# Patient Record
Sex: MALE | Race: WHITE | Employment: FULL TIME | URBAN - METROPOLITAN AREA
[De-identification: names, ages, dates, MRNs, and addresses within clinical notes are randomized per-mention and may not be internally consistent; named-entity substitution may affect disease eponyms.]

---

## 2017-10-16 ENCOUNTER — OFFICE VISIT (OUTPATIENT)
Dept: FAMILY MEDICINE CLINIC | Age: 52
End: 2017-10-16

## 2017-10-16 VITALS
OXYGEN SATURATION: 96 % | WEIGHT: 229.8 LBS | BODY MASS INDEX: 36.93 KG/M2 | DIASTOLIC BLOOD PRESSURE: 70 MMHG | SYSTOLIC BLOOD PRESSURE: 124 MMHG | TEMPERATURE: 95.7 F | HEIGHT: 66 IN | HEART RATE: 65 BPM

## 2017-10-16 DIAGNOSIS — F42.9 OBSESSIVE-COMPULSIVE DISORDER, UNSPECIFIED TYPE: ICD-10-CM

## 2017-10-16 DIAGNOSIS — K21.9 GASTROESOPHAGEAL REFLUX DISEASE, ESOPHAGITIS PRESENCE NOT SPECIFIED: Primary | ICD-10-CM

## 2017-10-16 DIAGNOSIS — Z71.3 DIETARY COUNSELING: ICD-10-CM

## 2017-10-16 DIAGNOSIS — G47.33 OSA (OBSTRUCTIVE SLEEP APNEA): ICD-10-CM

## 2017-10-16 DIAGNOSIS — M25.511 RIGHT SHOULDER PAIN, UNSPECIFIED CHRONICITY: ICD-10-CM

## 2017-10-16 DIAGNOSIS — E78.2 MIXED HYPERLIPIDEMIA: ICD-10-CM

## 2017-10-16 DIAGNOSIS — Z76.89 ESTABLISHING CARE WITH NEW DOCTOR, ENCOUNTER FOR: ICD-10-CM

## 2017-10-16 PROBLEM — E78.5 HYPERLIPIDEMIA: Status: ACTIVE | Noted: 2017-10-16

## 2017-10-16 PROBLEM — F32.A DEPRESSION: Status: ACTIVE | Noted: 2017-10-16

## 2017-10-16 PROCEDURE — 99203 OFFICE O/P NEW LOW 30 MIN: CPT | Performed by: FAMILY MEDICINE

## 2017-10-16 RX ORDER — LORATADINE 10 MG/1
1 TABLET ORAL
COMMUNITY
End: 2018-03-06 | Stop reason: SDUPTHER

## 2017-10-16 RX ORDER — VENLAFAXINE HYDROCHLORIDE 37.5 MG/1
CAPSULE, EXTENDED RELEASE ORAL
COMMUNITY
Start: 2017-09-18 | End: 2018-03-06 | Stop reason: SDUPTHER

## 2017-10-16 RX ORDER — OMEPRAZOLE 20 MG/1
20 TABLET, DELAYED RELEASE ORAL
COMMUNITY
Start: 2010-03-08 | End: 2018-03-06 | Stop reason: SDUPTHER

## 2017-10-16 RX ORDER — PRAVASTATIN SODIUM 40 MG
40 TABLET ORAL
COMMUNITY
Start: 2017-08-25 | End: 2018-03-06 | Stop reason: SDUPTHER

## 2017-10-16 RX ORDER — INFLUENZA A VIRUS A/MICHIGAN/45/2015 X-275 (H1N1) ANTIGEN (FORMALDEHYDE INACTIVATED), INFLUENZA A VIRUS A/SINGAPORE/INFIMH-16-0019/2016 IVR-186 (H3N2) ANTIGEN (FORMALDEHYDE INACTIVATED), INFLUENZA B VIRUS B/PHUKET/3073/2013 ANTIGEN (FORMALDEHYDE INACTIVATED), AND INFLUENZA B VIRUS B/MARYLAND/15/2016 BX-69A ANTIGEN (FORMALDEHYDE INACTIVATED) 15; 15; 15; 15 UG/.5ML; UG/.5ML; UG/.5ML; UG/.5ML
1 INJECTION, SUSPENSION INTRAMUSCULAR ONCE
COMMUNITY
Start: 2017-10-03 | End: 2019-03-11

## 2017-10-16 ASSESSMENT — PATIENT HEALTH QUESTIONNAIRE - PHQ9
SUM OF ALL RESPONSES TO PHQ QUESTIONS 1-9: 0
2. FEELING DOWN, DEPRESSED OR HOPELESS: 0
1. LITTLE INTEREST OR PLEASURE IN DOING THINGS: 0
SUM OF ALL RESPONSES TO PHQ9 QUESTIONS 1 & 2: 0

## 2017-10-16 ASSESSMENT — ENCOUNTER SYMPTOMS
NAUSEA: 0
SHORTNESS OF BREATH: 0
VOMITING: 0
DIARRHEA: 0
ABDOMINAL PAIN: 0
COUGH: 0

## 2017-10-16 NOTE — PROGRESS NOTES
Subjective:      Patient ID: Reno Chang is a 47 y.o. male. Austyn Smith is here to establish care, discuss his OCD/HAs, right shoulder, vertigo. OCD  At his last visit, his effexor was reduced to 37.5mg as he felt his HAs were from the effexor, and the dose was moved to bedtime. He has noticed some improvement, but not resolution. His OCD symptoms have slightly worsened with the lower dose - the thoughts have returned a little. Right shoulder  He is doing PT at [facility]. The shoulder is much better, but is not resolved yet. Currently they are working on the supraspinatus. Had dry needling this past week. Vertigo  Resolved with vestibular rehab at [facility]. STEPH  Chronic. On CPAP. Sleeping better but still not restful every night. Hyperlipidemia  Chronic. On lovastatin. Labs were good in August.     Review of Systems   Constitutional: Negative for fever and unexpected weight change. HENT: Negative for hearing loss. Eyes: Negative for visual disturbance. Respiratory: Negative for cough and shortness of breath. Cardiovascular: Negative for chest pain, palpitations and leg swelling. Gastrointestinal: Negative for abdominal pain, diarrhea, nausea and vomiting. Endocrine: Negative for cold intolerance and heat intolerance. Genitourinary: Negative for dysuria and hematuria. Musculoskeletal: Positive for arthralgias (right shoulder). Skin: Negative for rash and wound. Neurological: Positive for headaches. Negative for dizziness, weakness, light-headedness and numbness. Hematological: Negative for adenopathy. Does not bruise/bleed easily. Psychiatric/Behavioral: Negative for sleep disturbance. The patient is not nervous/anxious. Objective:   Physical Exam   Constitutional: He is oriented to person, place, and time. He appears well-developed and well-nourished. No distress. Cardiovascular: Normal rate and regular rhythm. No murmur heard.   Pulmonary/Chest: Effort normal

## 2017-11-06 ENCOUNTER — TELEPHONE (OUTPATIENT)
Dept: FAMILY MEDICINE CLINIC | Age: 52
End: 2017-11-06

## 2017-11-06 NOTE — TELEPHONE ENCOUNTER
----- Message from Alfredo Craft MD sent at 11/6/2017  8:11 AM EST -----  Please contact [address] for Giles's notes on his shoulder PT.      Thanks,   Dr. Jayashree Guan

## 2017-12-04 ENCOUNTER — TELEPHONE (OUTPATIENT)
Dept: FAMILY MEDICINE CLINIC | Age: 52
End: 2017-12-04

## 2017-12-04 DIAGNOSIS — M25.511 RIGHT SHOULDER PAIN, UNSPECIFIED CHRONICITY: Primary | ICD-10-CM

## 2017-12-04 NOTE — TELEPHONE ENCOUNTER
Giles's x ray of his shoulder was available for review today. Result Impression   Advanced degenerative changes. Electronically Signed by: Yoly Allison MD, 12/3/2017 3:27 PM   Result Narrative   REASON FOR EXAM M25.511: Pain in right shoulder  G89.29: Other chronic pain    Multiple routine views in various projections reveal the bones of the right shoulder, joints and adjacent soft tissues to demonstrate sclerosis of cortical cystic change humeral head. Exuberant spur shoulder joint. Mild spurring at [facility] joint. No fracture, dislocation, soft tissue swelling or soft tissue calcifications are evident.  There is not evidence of a foreign body. Given his lack of improvement with PT, the advanced arthritic changes and the spur, I recommend he see ortho for further evaluation and treatment. Who does he want to see?

## 2017-12-05 NOTE — TELEPHONE ENCOUNTER
Pt notified and said if it something that he can get fixed he would be willing to see someone.   Pt state he would like to see someone in [facility] if necessary.      (pt contact number is [phone])

## 2017-12-07 NOTE — TELEPHONE ENCOUNTER
Dr Lieutenant Rosen office dose not accept pt insurance and pt is not willing to travel to [facility]. Pt advised to call his insurance company to see who is in network and to call the office with information.

## 2018-03-06 ENCOUNTER — OFFICE VISIT (OUTPATIENT)
Dept: FAMILY MEDICINE CLINIC | Age: 53
End: 2018-03-06

## 2018-03-06 VITALS
SYSTOLIC BLOOD PRESSURE: 128 MMHG | DIASTOLIC BLOOD PRESSURE: 80 MMHG | TEMPERATURE: 96.2 F | HEART RATE: 71 BPM | WEIGHT: 230.2 LBS | BODY MASS INDEX: 37.16 KG/M2 | OXYGEN SATURATION: 97 %

## 2018-03-06 DIAGNOSIS — F42.9 OBSESSIVE-COMPULSIVE DISORDER, UNSPECIFIED TYPE: ICD-10-CM

## 2018-03-06 DIAGNOSIS — L74.8 FOOT ODOR: Primary | ICD-10-CM

## 2018-03-06 DIAGNOSIS — R05.9 COUGH: ICD-10-CM

## 2018-03-06 DIAGNOSIS — E78.2 MIXED HYPERLIPIDEMIA: ICD-10-CM

## 2018-03-06 DIAGNOSIS — K21.9 GASTROESOPHAGEAL REFLUX DISEASE, ESOPHAGITIS PRESENCE NOT SPECIFIED: ICD-10-CM

## 2018-03-06 DIAGNOSIS — R09.82 PND (POST-NASAL DRIP): ICD-10-CM

## 2018-03-06 PROCEDURE — 99214 OFFICE O/P EST MOD 30 MIN: CPT | Performed by: FAMILY MEDICINE

## 2018-03-06 RX ORDER — OMEPRAZOLE 20 MG/1
20 TABLET, DELAYED RELEASE ORAL DAILY
Qty: 90 TABLET | Refills: 3 | Status: SHIPPED | OUTPATIENT
Start: 2018-03-06 | End: 2019-03-11 | Stop reason: SDUPTHER

## 2018-03-06 RX ORDER — VENLAFAXINE HYDROCHLORIDE 37.5 MG/1
37.5 CAPSULE, EXTENDED RELEASE ORAL DAILY
Qty: 90 CAPSULE | Refills: 1 | Status: SHIPPED | OUTPATIENT
Start: 2018-03-06 | End: 2018-09-10 | Stop reason: SDUPTHER

## 2018-03-06 RX ORDER — LORATADINE 10 MG/1
10 TABLET ORAL DAILY
Qty: 90 TABLET | Refills: 3 | Status: SHIPPED | OUTPATIENT
Start: 2018-03-06 | End: 2019-03-11 | Stop reason: SDUPTHER

## 2018-03-06 RX ORDER — KETOCONAZOLE 20 MG/G
CREAM TOPICAL
Qty: 30 G | Refills: 1 | Status: SHIPPED | OUTPATIENT
Start: 2018-03-06 | End: 2020-08-26

## 2018-03-06 RX ORDER — PRAVASTATIN SODIUM 40 MG
40 TABLET ORAL DAILY
Qty: 90 TABLET | Refills: 1 | Status: SHIPPED | OUTPATIENT
Start: 2018-03-06 | End: 2018-08-29 | Stop reason: SDUPTHER

## 2018-03-06 RX ORDER — BUSPIRONE HYDROCHLORIDE 10 MG/1
10 TABLET ORAL 2 TIMES DAILY
Qty: 60 TABLET | Refills: 5 | Status: SHIPPED | OUTPATIENT
Start: 2018-03-06 | End: 2018-09-02 | Stop reason: SDUPTHER

## 2018-03-06 ASSESSMENT — ENCOUNTER SYMPTOMS
WHEEZING: 0
COUGH: 1
SHORTNESS OF BREATH: 0
SORE THROAT: 1

## 2018-03-06 NOTE — PROGRESS NOTES
History:   Diagnosis Date    GERD (gastroesophageal reflux disease)     Hyperlipidemia     OCD (obsessive compulsive disorder)     Sleep apnea     No past surgical history on file. Social History     Social History    Marital status: Single     Spouse name: N/A    Number of children: N/A    Years of education: N/A     Occupational History    Not on file. Social History Main Topics    Smoking status: Never Smoker    Smokeless tobacco: Never Used    Alcohol use No    Drug use: No    Sexual activity: Not on file     Other Topics Concern    Not on file     Social History Narrative    Sleep:  Hours of sleep 6-8      Bed Time 11:30pm      Wake Time 7-8 am      Number of times waking 0      Reason:      Restful sleep Yes      Sleep Aids CPAP         Diet:  Vegetables rare Servings per day variable     Fruit  Yes  Servings per day 1-2     Dairy  Yes  Servings per day 1     Protein Yes  Servings per day 2-3  Source: animal     Nuts No   Servings per week      Legumes No  Servings per day      Processed foods Yes Servings per week 3-4           Water Yes  Glasses per day 7     Caffeine Yes  Servings per day 2  Type Coca Cola        Exercise: Regular Yes       Type walking         Duration 30-60 minutes       Frequency 2-3 days a week          Current Outpatient Prescriptions:     venlafaxine (EFFEXOR XR) 37.5 MG extended release capsule, Take 1 capsule by mouth daily, Disp: 90 capsule, Rfl: 1    pravastatin (PRAVACHOL) 40 MG tablet, Take 1 tablet by mouth daily, Disp: 90 tablet, Rfl: 1    omeprazole (PRILOSEC OTC) 20 MG tablet, Take 1 tablet by mouth daily, Disp: 90 tablet, Rfl: 3    loratadine (CLARITIN) 10 MG tablet, Take 1 tablet by mouth daily, Disp: 90 tablet, Rfl: 3    busPIRone (BUSPAR) 10 MG tablet, Take 1 tablet by mouth 2 times daily, Disp: 60 tablet, Rfl: 5    ketoconazole (NIZORAL) 2 % cream, Apply topically daily.  To toes and heels, Disp: 30 g, Rfl: 1    FLUZONE QUADRIVALENT 0.5 ML

## 2018-09-10 ENCOUNTER — OFFICE VISIT (OUTPATIENT)
Dept: FAMILY MEDICINE CLINIC | Age: 53
End: 2018-09-10

## 2018-09-10 VITALS
HEART RATE: 75 BPM | SYSTOLIC BLOOD PRESSURE: 132 MMHG | BODY MASS INDEX: 38.09 KG/M2 | TEMPERATURE: 96.8 F | OXYGEN SATURATION: 98 % | WEIGHT: 236 LBS | DIASTOLIC BLOOD PRESSURE: 80 MMHG

## 2018-09-10 DIAGNOSIS — Z23 NEEDS FLU SHOT: ICD-10-CM

## 2018-09-10 DIAGNOSIS — E78.2 MIXED HYPERLIPIDEMIA: Primary | ICD-10-CM

## 2018-09-10 DIAGNOSIS — F32.A DEPRESSION, UNSPECIFIED DEPRESSION TYPE: ICD-10-CM

## 2018-09-10 DIAGNOSIS — H61.23 EXCESSIVE CERUMEN IN BOTH EAR CANALS: ICD-10-CM

## 2018-09-10 DIAGNOSIS — H91.93 HEARING PROBLEM OF BOTH EARS: ICD-10-CM

## 2018-09-10 DIAGNOSIS — K21.9 GASTROESOPHAGEAL REFLUX DISEASE, ESOPHAGITIS PRESENCE NOT SPECIFIED: ICD-10-CM

## 2018-09-10 DIAGNOSIS — F42.9 OBSESSIVE-COMPULSIVE DISORDER, UNSPECIFIED TYPE: ICD-10-CM

## 2018-09-10 LAB
A/G RATIO: 2.3 (ref 1.1–2.2)
ALBUMIN SERPL-MCNC: 4.5 G/DL (ref 3.4–5)
ALP BLD-CCNC: 78 U/L (ref 40–129)
ALT SERPL-CCNC: 11 U/L (ref 10–40)
ANION GAP SERPL CALCULATED.3IONS-SCNC: 14 MMOL/L (ref 3–16)
AST SERPL-CCNC: 17 U/L (ref 15–37)
BILIRUB SERPL-MCNC: 0.3 MG/DL (ref 0–1)
BUN BLDV-MCNC: 15 MG/DL (ref 7–20)
CALCIUM SERPL-MCNC: 9.4 MG/DL (ref 8.3–10.6)
CHLORIDE BLD-SCNC: 102 MMOL/L (ref 99–110)
CHOLESTEROL, FASTING: 147 MG/DL (ref 0–199)
CO2: 25 MMOL/L (ref 21–32)
CREAT SERPL-MCNC: 0.8 MG/DL (ref 0.9–1.3)
GFR AFRICAN AMERICAN: >60
GFR NON-AFRICAN AMERICAN: >60
GLOBULIN: 2 G/DL
GLUCOSE FASTING: 135 MG/DL (ref 70–99)
HDLC SERPL-MCNC: 45 MG/DL (ref 40–60)
LDL CHOLESTEROL CALCULATED: 75 MG/DL
POTASSIUM SERPL-SCNC: 3.8 MMOL/L (ref 3.5–5.1)
SODIUM BLD-SCNC: 141 MMOL/L (ref 136–145)
TOTAL PROTEIN: 6.5 G/DL (ref 6.4–8.2)
TRIGLYCERIDE, FASTING: 134 MG/DL (ref 0–150)
VLDLC SERPL CALC-MCNC: 27 MG/DL

## 2018-09-10 PROCEDURE — 36415 COLL VENOUS BLD VENIPUNCTURE: CPT | Performed by: FAMILY MEDICINE

## 2018-09-10 PROCEDURE — 90471 IMMUNIZATION ADMIN: CPT | Performed by: FAMILY MEDICINE

## 2018-09-10 PROCEDURE — 90686 IIV4 VACC NO PRSV 0.5 ML IM: CPT | Performed by: FAMILY MEDICINE

## 2018-09-10 PROCEDURE — 99214 OFFICE O/P EST MOD 30 MIN: CPT | Performed by: FAMILY MEDICINE

## 2018-09-10 RX ORDER — BUSPIRONE HYDROCHLORIDE 10 MG/1
10 TABLET ORAL 2 TIMES DAILY
Qty: 180 TABLET | Refills: 1 | Status: SHIPPED | OUTPATIENT
Start: 2018-09-10 | End: 2019-03-11 | Stop reason: SDUPTHER

## 2018-09-10 RX ORDER — PRAVASTATIN SODIUM 40 MG
40 TABLET ORAL DAILY
Qty: 90 TABLET | Refills: 1 | Status: SHIPPED | OUTPATIENT
Start: 2018-09-10 | End: 2019-03-11 | Stop reason: SDUPTHER

## 2018-09-10 RX ORDER — VENLAFAXINE HYDROCHLORIDE 37.5 MG/1
37.5 CAPSULE, EXTENDED RELEASE ORAL DAILY
Qty: 90 CAPSULE | Refills: 1 | Status: SHIPPED | OUTPATIENT
Start: 2018-09-10 | End: 2019-03-11 | Stop reason: SDUPTHER

## 2018-09-10 ASSESSMENT — ENCOUNTER SYMPTOMS: SHORTNESS OF BREATH: 0

## 2019-03-11 ENCOUNTER — OFFICE VISIT (OUTPATIENT)
Dept: FAMILY MEDICINE CLINIC | Age: 54
End: 2019-03-11
Payer: OTHER GOVERNMENT

## 2019-03-11 VITALS
HEART RATE: 74 BPM | DIASTOLIC BLOOD PRESSURE: 82 MMHG | SYSTOLIC BLOOD PRESSURE: 136 MMHG | WEIGHT: 234.4 LBS | BODY MASS INDEX: 37.83 KG/M2 | OXYGEN SATURATION: 96 % | TEMPERATURE: 96.7 F

## 2019-03-11 DIAGNOSIS — F42.9 OBSESSIVE-COMPULSIVE DISORDER, UNSPECIFIED TYPE: ICD-10-CM

## 2019-03-11 DIAGNOSIS — K21.9 GASTROESOPHAGEAL REFLUX DISEASE, ESOPHAGITIS PRESENCE NOT SPECIFIED: ICD-10-CM

## 2019-03-11 DIAGNOSIS — R73.9 HYPERGLYCEMIA: ICD-10-CM

## 2019-03-11 DIAGNOSIS — F70 MILD MENTAL SLOWING: ICD-10-CM

## 2019-03-11 DIAGNOSIS — E78.2 MIXED HYPERLIPIDEMIA: ICD-10-CM

## 2019-03-11 DIAGNOSIS — K14.8 TONGUE LESION: Primary | ICD-10-CM

## 2019-03-11 LAB
ALBUMIN SERPL-MCNC: 4.7 G/DL
ALP BLD-CCNC: 84 U/L
ALT SERPL-CCNC: 17 U/L
ANION GAP SERPL CALCULATED.3IONS-SCNC: 2.6 MMOL/L
AST SERPL-CCNC: 18 U/L
BILIRUB SERPL-MCNC: 0.5 MG/DL (ref 0.1–1.4)
BUN BLDV-MCNC: 14 MG/DL
CALCIUM SERPL-MCNC: 9.1 MG/DL
CHLORIDE BLD-SCNC: 102 MMOL/L
CHOLESTEROL, TOTAL: 148 MG/DL
CHOLESTEROL/HDL RATIO: NORMAL
CO2: 26 MMOL/L
CREAT SERPL-MCNC: 0.9 MG/DL
GFR CALCULATED: 96
GLUCOSE BLD-MCNC: 100 MG/DL
HBA1C MFR BLD: 5.6 %
HDLC SERPL-MCNC: 47 MG/DL (ref 35–70)
LDL CHOLESTEROL CALCULATED: 86 MG/DL (ref 0–160)
POTASSIUM SERPL-SCNC: 4 MMOL/L
SODIUM BLD-SCNC: 139 MMOL/L
TOTAL PROTEIN: 6.5
TRIGL SERPL-MCNC: 74 MG/DL
VLDLC SERPL CALC-MCNC: 15 MG/DL

## 2019-03-11 PROCEDURE — 83036 HEMOGLOBIN GLYCOSYLATED A1C: CPT | Performed by: FAMILY MEDICINE

## 2019-03-11 PROCEDURE — 99214 OFFICE O/P EST MOD 30 MIN: CPT | Performed by: FAMILY MEDICINE

## 2019-03-11 RX ORDER — LORATADINE 10 MG/1
10 TABLET ORAL DAILY
Qty: 90 TABLET | Refills: 3 | Status: SHIPPED | OUTPATIENT
Start: 2019-03-11

## 2019-03-11 RX ORDER — VENLAFAXINE HYDROCHLORIDE 37.5 MG/1
37.5 CAPSULE, EXTENDED RELEASE ORAL DAILY
Qty: 90 CAPSULE | Refills: 1 | Status: SHIPPED | OUTPATIENT
Start: 2019-03-11 | End: 2019-09-05 | Stop reason: SDUPTHER

## 2019-03-11 RX ORDER — BUSPIRONE HYDROCHLORIDE 10 MG/1
10 TABLET ORAL 2 TIMES DAILY
Qty: 180 TABLET | Refills: 1 | Status: SHIPPED | OUTPATIENT
Start: 2019-03-11 | End: 2019-06-25 | Stop reason: SINTOL

## 2019-03-11 RX ORDER — PRAVASTATIN SODIUM 40 MG
40 TABLET ORAL DAILY
Qty: 90 TABLET | Refills: 1 | Status: SHIPPED | OUTPATIENT
Start: 2019-03-11 | End: 2019-09-09 | Stop reason: SDUPTHER

## 2019-03-11 RX ORDER — OMEPRAZOLE 20 MG/1
20 TABLET, DELAYED RELEASE ORAL DAILY
Qty: 90 TABLET | Refills: 3 | Status: SHIPPED | OUTPATIENT
Start: 2019-03-11 | End: 2020-05-13 | Stop reason: SDUPTHER

## 2019-03-11 ASSESSMENT — ENCOUNTER SYMPTOMS: SHORTNESS OF BREATH: 0

## 2019-04-15 ENCOUNTER — PATIENT MESSAGE (OUTPATIENT)
Dept: OTHER | Facility: CLINIC | Age: 54
End: 2019-04-15

## 2019-04-29 NOTE — TELEPHONE ENCOUNTER
From: Genoveva Valdes RN  To: Serena Duarte  Sent: 4/15/2019 3:49 PM EDT  Subject: You may be due for a Colorectal Cancer Screening    April 15, 2019      Serena Duarte,     We are contacting you because our records show you might be due for a screening test for colorectal cancer, which is the most preventable cancer. Screening tests for colorectal cancer not only include colonoscopy, but also simple tests that you can do from the comfort of your home at little to no cost for you. You can work with your healthcare provider to determine which screening test is right for you and how often you should be screened. Please reply to this message if you would like to schedule a test.     If you have already had a colorectal cancer screening, nice work! Please reply to this message with when and where you had the test performed, so we can add a copy to your chart. Did you know that colorectal cancer is the second leading cause of cancer deaths for men and women 54-65 years of age? When colorectal cancer is caught early, there's over a 90% chance it can be treated successfully. However, the first signs of this type of cancer often go undetected, so screening tests are important even if you have no symptoms.     Sincerely,      Your health care team

## 2019-06-25 ENCOUNTER — OFFICE VISIT (OUTPATIENT)
Dept: FAMILY MEDICINE CLINIC | Age: 54
End: 2019-06-25
Payer: OTHER GOVERNMENT

## 2019-06-25 VITALS
TEMPERATURE: 95.5 F | BODY MASS INDEX: 37.41 KG/M2 | WEIGHT: 231.8 LBS | OXYGEN SATURATION: 98 % | HEART RATE: 74 BPM | DIASTOLIC BLOOD PRESSURE: 68 MMHG | SYSTOLIC BLOOD PRESSURE: 130 MMHG

## 2019-06-25 DIAGNOSIS — M25.511 CHRONIC RIGHT SHOULDER PAIN: Primary | ICD-10-CM

## 2019-06-25 DIAGNOSIS — M77.11 LATERAL EPICONDYLITIS OF RIGHT ELBOW: ICD-10-CM

## 2019-06-25 DIAGNOSIS — G89.29 CHRONIC RIGHT SHOULDER PAIN: Primary | ICD-10-CM

## 2019-06-25 DIAGNOSIS — F42.9 OBSESSIVE-COMPULSIVE DISORDER, UNSPECIFIED TYPE: ICD-10-CM

## 2019-06-25 PROCEDURE — 99214 OFFICE O/P EST MOD 30 MIN: CPT | Performed by: FAMILY MEDICINE

## 2019-06-25 RX ORDER — HYDROXYZINE HYDROCHLORIDE 25 MG/1
25 TABLET, FILM COATED ORAL 3 TIMES DAILY PRN
Qty: 20 TABLET | Refills: 0 | Status: SHIPPED | OUTPATIENT
Start: 2019-06-25 | End: 2019-07-05

## 2019-06-25 RX ORDER — PREDNISONE 20 MG/1
40 TABLET ORAL DAILY
Qty: 10 TABLET | Refills: 0 | Status: SHIPPED | OUTPATIENT
Start: 2019-06-25 | End: 2019-06-30

## 2019-06-25 NOTE — PROGRESS NOTES
Subjective:      Patient ID: Jing Nagy is a 53 y.o. male. Igor Clinton is here to discuss his elbow and shoulder pain and Buspar. Elbow pain  Present for several weeks. No injury. Using tennis elbow strap with some benefit. Aleve helps some too, but not resolved. Lateral pain that goes up and down his arm. Affects . His shoulder is bothering him more now as well. OCD  Buspar was stopped secondary to HAs. HAs have resolved, but OCD is less well controlled. He continues with Effexor daily. Review of Systems   Neurological: Positive for weakness. Negative for numbness. Past Medical History:   Diagnosis Date    GERD (gastroesophageal reflux disease)     Hyperlipidemia     OCD (obsessive compulsive disorder)     Sleep apnea      No past surgical history on file.   Social History     Socioeconomic History    Marital status: Single     Spouse name: Not on file    Number of children: Not on file    Years of education: Not on file    Highest education level: Not on file   Occupational History    Not on file   Social Needs    Financial resource strain: Not on file    Food insecurity:     Worry: Not on file     Inability: Not on file    Transportation needs:     Medical: Not on file     Non-medical: Not on file   Tobacco Use    Smoking status: Never Smoker    Smokeless tobacco: Never Used   Substance and Sexual Activity    Alcohol use: No    Drug use: No    Sexual activity: Not on file   Lifestyle    Physical activity:     Days per week: Not on file     Minutes per session: Not on file    Stress: Not on file   Relationships    Social connections:     Talks on phone: Not on file     Gets together: Not on file     Attends Orthodox service: Not on file     Active member of club or organization: Not on file     Attends meetings of clubs or organizations: Not on file     Relationship status: Not on file    Intimate partner violence:     Fear of current or ex partner: Not on file Emotionally abused: Not on file     Physically abused: Not on file     Forced sexual activity: Not on file   Other Topics Concern    Not on file   Social History Narrative    Sleep:  Hours of sleep 6-8      Bed Time 11:30pm      Wake Time 7-8 am      Number of times waking 0      Reason:      Restful sleep Yes      Sleep Aids CPAP         Diet:  Vegetables rare Servings per day variable     Fruit  Yes  Servings per day 1-2     Dairy  Yes  Servings per day 1     Protein Yes  Servings per day 2-3  Source: animal     Nuts No   Servings per week      Legumes No  Servings per day      Processed foods Yes Servings per week 3-4           Water Yes  Glasses per day 7     Caffeine Yes  Servings per day 2  Type Coca Cola        Exercise: Regular Yes       Type walking         Duration 30-60 minutes       Frequency 2-3 days a week         Family History   Problem Relation Age of Onset    Diabetes Mother     High Blood Pressure Mother     Cancer Father     High Blood Pressure Father     Arthritis Sister         RA    Asthma Sister     Allergy (Severe) Sister          Objective:   Physical Exam   Constitutional: He is oriented to person, place, and time. He appears well-developed and well-nourished. No distress. Musculoskeletal:        Right shoulder: He exhibits decreased range of motion. Right elbow: He exhibits swelling (at lat epicondyle). Tenderness found. Lateral epicondyle tenderness noted. Neurological: He is alert and oriented to person, place, and time. Psychiatric: He has a normal mood and affect. Nursing note and vitals reviewed. Assessment:       Diagnosis Orders   1. Chronic right shoulder pain  External Referral To Physical Therapy   2. Lateral epicondylitis of right elbow  External Referral To Physical Therapy   3. Obsessive-compulsive disorder, unspecified type  hydrOXYzine (ATARAX) 25 MG tablet           Plan:      1. Flared up by tennis elbow.  Referred to PT to help get control again.     2. Continue Aleve BID. Continue tennis elbow strap. Add steroid burst and PT. If no significant improvement, will do steroid injection. Referred to [facility] as he has gone there in the past.     3. Stay off Buspar. Try Vistaril prn for bad obsessive thoughts. If this doesn't help, consider ADD meds. Follow up in September as previously planned. Current Outpatient Medications:     predniSONE (DELTASONE) 20 MG tablet, Take 2 tablets by mouth daily for 5 days, Disp: 10 tablet, Rfl: 0    hydrOXYzine (ATARAX) 25 MG tablet, Take 1 tablet by mouth 3 times daily as needed (obsessive thoughts), Disp: 20 tablet, Rfl: 0    loratadine (CLARITIN) 10 MG tablet, Take 1 tablet by mouth daily, Disp: 90 tablet, Rfl: 3    omeprazole (PRILOSEC OTC) 20 MG tablet, Take 1 tablet by mouth daily, Disp: 90 tablet, Rfl: 3    pravastatin (PRAVACHOL) 40 MG tablet, Take 1 tablet by mouth daily, Disp: 90 tablet, Rfl: 1    venlafaxine (EFFEXOR XR) 37.5 MG extended release capsule, Take 1 capsule by mouth daily, Disp: 90 capsule, Rfl: 1    ketoconazole (NIZORAL) 2 % cream, Apply topically daily.  To toes and heels, Disp: 30 g, Rfl: 1          Funmilayo Britt MD

## 2019-07-10 ENCOUNTER — OFFICE VISIT (OUTPATIENT)
Dept: FAMILY MEDICINE CLINIC | Age: 54
End: 2019-07-10
Payer: OTHER GOVERNMENT

## 2019-07-10 VITALS
BODY MASS INDEX: 37.22 KG/M2 | TEMPERATURE: 97.3 F | SYSTOLIC BLOOD PRESSURE: 138 MMHG | WEIGHT: 230.6 LBS | HEART RATE: 79 BPM | DIASTOLIC BLOOD PRESSURE: 80 MMHG | OXYGEN SATURATION: 97 %

## 2019-07-10 DIAGNOSIS — M71.21 SYNOVIAL CYST OF RIGHT POPLITEAL SPACE: ICD-10-CM

## 2019-07-10 DIAGNOSIS — M25.561 ACUTE PAIN OF RIGHT KNEE: Primary | ICD-10-CM

## 2019-07-10 DIAGNOSIS — M62.838 TRAPEZIUS MUSCLE SPASM: ICD-10-CM

## 2019-07-10 PROCEDURE — 99213 OFFICE O/P EST LOW 20 MIN: CPT | Performed by: FAMILY MEDICINE

## 2019-07-10 RX ORDER — ORPHENADRINE CITRATE 100 MG/1
100 TABLET, EXTENDED RELEASE ORAL NIGHTLY
Qty: 14 TABLET | Refills: 0 | Status: SHIPPED | OUTPATIENT
Start: 2019-07-10 | End: 2019-07-24

## 2019-07-10 NOTE — PROGRESS NOTES
Forced sexual activity: Not on file   Other Topics Concern    Not on file   Social History Narrative    Sleep:  Hours of sleep 6-8      Bed Time 11:30pm      Wake Time 7-8 am      Number of times waking 0      Reason:      Restful sleep Yes      Sleep Aids CPAP         Diet:  Vegetables rare Servings per day variable     Fruit  Yes  Servings per day 1-2     Dairy  Yes  Servings per day 1     Protein Yes  Servings per day 2-3  Source: animal     Nuts No   Servings per week      Legumes No  Servings per day      Processed foods Yes Servings per week 3-4           Water Yes  Glasses per day 7     Caffeine Yes  Servings per day 2  Type Coca Cola        Exercise: Regular Yes       Type walking         Duration 30-60 minutes       Frequency 2-3 days a week         Family History   Problem Relation Age of Onset    Diabetes Mother     High Blood Pressure Mother     Cancer Father     High Blood Pressure Father     Arthritis Sister         RA    Asthma Sister     Allergy (Severe) Sister          Objective:   Physical Exam   Constitutional: He is oriented to person, place, and time. He appears well-developed and well-nourished. No distress. Musculoskeletal:        Right knee: He exhibits swelling (posterior knee - feels like baker's cyst. ). He exhibits no effusion, no LCL laxity and no MCL laxity. No medial joint line, no lateral joint line, no MCL, no LCL and no patellar tendon tenderness noted. Cervical back: He exhibits spasm (trap). Neurological: He is alert and oriented to person, place, and time. Psychiatric: He has a normal mood and affect. Nursing note and vitals reviewed. Assessment:       Diagnosis Orders   1. Acute pain of right knee  XR KNEE RIGHT (1-2 VIEWS)    External Referral To Orthopedic Surgery   2. Synovial cyst of right popliteal space  External Referral To Orthopedic Surgery   3. Trapezius muscle spasm             Plan:      1 & 2.  Get x ray of knee and referred to ortho for

## 2019-09-05 DIAGNOSIS — F42.9 OBSESSIVE-COMPULSIVE DISORDER, UNSPECIFIED TYPE: ICD-10-CM

## 2019-09-06 RX ORDER — VENLAFAXINE HYDROCHLORIDE 37.5 MG/1
CAPSULE, EXTENDED RELEASE ORAL
Qty: 90 CAPSULE | Refills: 0 | Status: SHIPPED | OUTPATIENT
Start: 2019-09-06 | End: 2019-09-09 | Stop reason: SDUPTHER

## 2019-09-09 ENCOUNTER — OFFICE VISIT (OUTPATIENT)
Dept: FAMILY MEDICINE CLINIC | Age: 54
End: 2019-09-09
Payer: OTHER GOVERNMENT

## 2019-09-09 VITALS
OXYGEN SATURATION: 98 % | SYSTOLIC BLOOD PRESSURE: 132 MMHG | BODY MASS INDEX: 35.6 KG/M2 | WEIGHT: 221.5 LBS | TEMPERATURE: 96.6 F | HEART RATE: 74 BPM | DIASTOLIC BLOOD PRESSURE: 84 MMHG | HEIGHT: 66 IN

## 2019-09-09 DIAGNOSIS — Z23 NEEDS FLU SHOT: ICD-10-CM

## 2019-09-09 DIAGNOSIS — E78.2 MIXED HYPERLIPIDEMIA: ICD-10-CM

## 2019-09-09 DIAGNOSIS — F42.9 OBSESSIVE-COMPULSIVE DISORDER, UNSPECIFIED TYPE: ICD-10-CM

## 2019-09-09 DIAGNOSIS — K21.9 GASTROESOPHAGEAL REFLUX DISEASE, ESOPHAGITIS PRESENCE NOT SPECIFIED: Primary | ICD-10-CM

## 2019-09-09 PROCEDURE — 90471 IMMUNIZATION ADMIN: CPT | Performed by: FAMILY MEDICINE

## 2019-09-09 PROCEDURE — 99214 OFFICE O/P EST MOD 30 MIN: CPT | Performed by: FAMILY MEDICINE

## 2019-09-09 PROCEDURE — 36415 COLL VENOUS BLD VENIPUNCTURE: CPT | Performed by: FAMILY MEDICINE

## 2019-09-09 PROCEDURE — 90686 IIV4 VACC NO PRSV 0.5 ML IM: CPT | Performed by: FAMILY MEDICINE

## 2019-09-09 RX ORDER — VENLAFAXINE HYDROCHLORIDE 37.5 MG/1
37.5 CAPSULE, EXTENDED RELEASE ORAL DAILY
Qty: 90 CAPSULE | Refills: 1 | Status: SHIPPED | OUTPATIENT
Start: 2019-09-09 | End: 2020-03-09 | Stop reason: SDUPTHER

## 2019-09-09 RX ORDER — PRAVASTATIN SODIUM 40 MG
40 TABLET ORAL DAILY
Qty: 90 TABLET | Refills: 1 | Status: SHIPPED | OUTPATIENT
Start: 2019-09-09 | End: 2020-03-09 | Stop reason: SDUPTHER

## 2019-09-09 ASSESSMENT — ENCOUNTER SYMPTOMS: SHORTNESS OF BREATH: 0

## 2019-09-09 NOTE — PROGRESS NOTES
Vaccine Information Sheet, \"Influenza - Inactivated\"  given to Liz Fuentes, or parent/legal guardian of  Liz Fuentes and verbalized understanding. Patient responses:    Have you ever had a reaction to a flu vaccine? No  Are you able to eat eggs without adverse effects? Yes  Do you have any current illness? No  Have you ever had [place] Syndrome? No    Flu vaccine given per order. Please see immunization tab.

## 2019-09-09 NOTE — PROGRESS NOTES
Subjective:      Patient ID: Jesse Britt is a 53 y.o. male. Manuela Grande is here to follow up on his OCD, GERD and lipids. Gastroesophageal Reflux   He reports no chest pain. This is a chronic problem. He has tried a PPI for the symptoms. The treatment provided significant relief. Hyperlipidemia   This is a chronic problem. The problem is controlled. Pertinent negatives include no chest pain or shortness of breath. Current antihyperlipidemic treatment includes statins. The current treatment provides significant improvement of lipids. OCD  Chronic. On Effexor daily. Tolerates ok. Works ok. Under a lot of stress currently - family members health. This has him obsessing more. Joint pain  Doing PT, some better but not gone. Ortho did not recommend MRI or surgery for knee. Offered injection pt declined at the time. Review of Systems   Respiratory: Negative for shortness of breath. Cardiovascular: Negative for chest pain. Past Medical History:   Diagnosis Date    GERD (gastroesophageal reflux disease)     Hyperlipidemia     OCD (obsessive compulsive disorder)     Sleep apnea      No past surgical history on file.   Social History     Socioeconomic History    Marital status: Single     Spouse name: Not on file    Number of children: Not on file    Years of education: Not on file    Highest education level: Not on file   Occupational History    Not on file   Social Needs    Financial resource strain: Not on file    Food insecurity:     Worry: Not on file     Inability: Not on file    Transportation needs:     Medical: Not on file     Non-medical: Not on file   Tobacco Use    Smoking status: Never Smoker    Smokeless tobacco: Never Used   Substance and Sexual Activity    Alcohol use: No    Drug use: No    Sexual activity: Not on file   Lifestyle    Physical activity:     Days per week: Not on file     Minutes per session: Not on file    Stress: Not on file   Relationships   

## 2019-09-10 LAB
A/G RATIO: 2.5 (CALC) (ref 0.8–2.6)
ALBUMIN SERPL-MCNC: 4.5 GM/DL (ref 3.5–5.2)
ALP BLD-CCNC: 79 U/L (ref 23–144)
ALT SERPL-CCNC: 14 U/L (ref 0–60)
AST SERPL-CCNC: 19 U/L (ref 0–46)
BILIRUB SERPL-MCNC: 0.4 MG/DL (ref 0–1.2)
BUN / CREAT RATIO: 20 (CALC) (ref 7–25)
BUN BLDV-MCNC: 18 MG/DL (ref 3–29)
CALCIUM SERPL-MCNC: 9.4 MG/DL (ref 8.5–10.5)
CHLORIDE BLD-SCNC: 103 MEQ/L (ref 96–110)
CHOLESTEROL, TOTAL: 128 MG/DL
CO2: 27 MEQ/L (ref 19–32)
CREAT SERPL-MCNC: 0.9 MG/DL
GFR SERPL CREATININE-BSD FRML MDRD: 96 ML/MIN/1.73M2
GLOBULIN: 1.8 GM/DL (CALC) (ref 1.9–3.6)
GLUCOSE BLD-MCNC: 159 MG/DL
HDLC SERPL-MCNC: 44 MG/DL
LDL CHOLESTEROL: 71 MG/DL (CALC)
POTASSIUM SERPL-SCNC: 3.7 MEQ/L (ref 3.4–5.3)
SODIUM BLD-SCNC: 142 MEQ/L (ref 135–148)
TOTAL PROTEIN: 6.3 GM/DL (ref 6–8.3)
TRIGL SERPL-MCNC: 67 MG/DL
VLDLC SERPL CALC-MCNC: 13 MG/DL (CALC) (ref 4–38)

## 2020-03-09 ENCOUNTER — OFFICE VISIT (OUTPATIENT)
Dept: FAMILY MEDICINE CLINIC | Age: 55
End: 2020-03-09
Payer: OTHER GOVERNMENT

## 2020-03-09 VITALS
HEART RATE: 70 BPM | BODY MASS INDEX: 34.2 KG/M2 | OXYGEN SATURATION: 98 % | WEIGHT: 212.8 LBS | SYSTOLIC BLOOD PRESSURE: 122 MMHG | DIASTOLIC BLOOD PRESSURE: 78 MMHG | HEIGHT: 66 IN

## 2020-03-09 PROCEDURE — 36415 COLL VENOUS BLD VENIPUNCTURE: CPT | Performed by: FAMILY MEDICINE

## 2020-03-09 PROCEDURE — 99214 OFFICE O/P EST MOD 30 MIN: CPT | Performed by: FAMILY MEDICINE

## 2020-03-09 RX ORDER — PRAVASTATIN SODIUM 40 MG
40 TABLET ORAL DAILY
Qty: 90 TABLET | Refills: 1 | Status: SHIPPED | OUTPATIENT
Start: 2020-03-09 | End: 2020-06-08 | Stop reason: SDUPTHER

## 2020-03-09 RX ORDER — ZOSTER VACCINE RECOMBINANT, ADJUVANTED 50 MCG/0.5
0.5 KIT INTRAMUSCULAR SEE ADMIN INSTRUCTIONS
Qty: 0.5 ML | Refills: 0 | Status: SHIPPED | OUTPATIENT
Start: 2020-03-09 | End: 2020-09-05

## 2020-03-09 RX ORDER — VENLAFAXINE HYDROCHLORIDE 75 MG/1
75 CAPSULE, EXTENDED RELEASE ORAL DAILY
Qty: 90 CAPSULE | Refills: 0 | Status: SHIPPED | OUTPATIENT
Start: 2020-03-09 | End: 2020-06-08 | Stop reason: SDUPTHER

## 2020-03-09 ASSESSMENT — ENCOUNTER SYMPTOMS: SHORTNESS OF BREATH: 0

## 2020-03-09 NOTE — PROGRESS NOTES
normal.      Breath sounds: Normal breath sounds. No wheezing, rhonchi or rales. Abdominal:      General: Bowel sounds are normal.      Palpations: Abdomen is soft. There is no mass. Tenderness: There is no abdominal tenderness. Musculoskeletal:         General: No tenderness. Right lower leg: No edema. Left lower leg: No edema. Lymphadenopathy:      Cervical: No cervical adenopathy. Neurological:      General: No focal deficit present. Mental Status: He is alert and oriented to person, place, and time. Psychiatric:         Mood and Affect: Mood normal.         Behavior: Behavior normal.         Assessment:       Diagnosis Orders   1. Mixed hyperlipidemia  HEPATIC FUNCTION PANEL    Lipid, Fasting    pravastatin (PRAVACHOL) 40 MG tablet   2. Obsessive-compulsive disorder, unspecified type  venlafaxine (EFFEXOR XR) 75 MG extended release capsule   3. Gastroesophageal reflux disease, esophagitis presence not specified     4. Severe obstructive sleep apnea     5. Need for shingles vaccine  zoster recombinant adjuvanted vaccine (SHINGRIX) 50 MCG/0.5ML SUSR injection          Plan:      1. Check lipids for stability. Continue meds. 2. Uncontrolled. Increase effxor to 75 mg and re-evaluate. Call with side effects. 3. Stable on omeprazole, continue. 4. Continue CPAP. Doing well. 5. Shingrix script provided. Follow up 3 months for OCD if needed. Follow up 6 months: Lipids.        Current Outpatient Medications:     zoster recombinant adjuvanted vaccine (SHINGRIX) 50 MCG/0.5ML SUSR injection, Inject 0.5 mLs into the muscle See Admin Instructions 1 dose now and repeat in 2-6 months, Disp: 0.5 mL, Rfl: 0    pravastatin (PRAVACHOL) 40 MG tablet, Take 1 tablet by mouth daily, Disp: 90 tablet, Rfl: 1    venlafaxine (EFFEXOR XR) 75 MG extended release capsule, Take 1 capsule by mouth daily, Disp: 90 capsule, Rfl: 0    loratadine (CLARITIN) 10 MG tablet, Take 1 tablet by mouth daily, Disp: 90 tablet, Rfl: 3    omeprazole (PRILOSEC OTC) 20 MG tablet, Take 1 tablet by mouth daily, Disp: 90 tablet, Rfl: 3    ketoconazole (NIZORAL) 2 % cream, Apply topically daily.  To toes and heels, Disp: 30 g, Rfl: 1         Madolyn Hammans, MD

## 2020-03-10 LAB
A/G RATIO: 2.1 (CALC) (ref 0.8–2.6)
ALBUMIN SERPL-MCNC: 4.5 GM/DL (ref 3.5–5.2)
ALP BLD-CCNC: 80 U/L (ref 23–144)
ALT SERPL-CCNC: 13 U/L (ref 0–60)
AST SERPL-CCNC: 13 U/L (ref 0–46)
BILIRUB SERPL-MCNC: 0.6 MG/DL (ref 0–1.2)
BILIRUBIN DIRECT: <0.2 MG/DL (ref 0–0.4)
BILIRUBIN, INDIRECT: NORMAL MG/DL (CALC) (ref 0–1.2)
CHOLESTEROL, TOTAL: 163 MG/DL
GLOBULIN: 2.1 GM/DL (CALC) (ref 1.9–3.6)
HDLC SERPL-MCNC: 48 MG/DL
LDL CHOLESTEROL: 102 MG/DL (CALC)
TOTAL PROTEIN: 6.6 GM/DL (ref 6–8.3)
TRIGL SERPL-MCNC: 67 MG/DL
VLDLC SERPL CALC-MCNC: 13 MG/DL (CALC) (ref 4–38)

## 2020-04-02 RX ORDER — OMEPRAZOLE 20 MG/1
CAPSULE, DELAYED RELEASE ORAL
Qty: 90 CAPSULE | Refills: 2 | OUTPATIENT
Start: 2020-04-02

## 2020-05-13 RX ORDER — OMEPRAZOLE 20 MG/1
20 TABLET, DELAYED RELEASE ORAL DAILY
Qty: 90 TABLET | Refills: 3 | Status: SHIPPED | OUTPATIENT
Start: 2020-05-13 | End: 2020-05-29 | Stop reason: SDUPTHER

## 2020-06-08 ENCOUNTER — VIRTUAL VISIT (OUTPATIENT)
Dept: FAMILY MEDICINE CLINIC | Age: 55
End: 2020-06-08
Payer: OTHER GOVERNMENT

## 2020-06-08 PROCEDURE — 99214 OFFICE O/P EST MOD 30 MIN: CPT | Performed by: FAMILY MEDICINE

## 2020-06-08 RX ORDER — PRAVASTATIN SODIUM 40 MG
40 TABLET ORAL DAILY
Qty: 90 TABLET | Refills: 0 | Status: SHIPPED | OUTPATIENT
Start: 2020-06-08 | End: 2020-08-26 | Stop reason: SDUPTHER

## 2020-06-08 RX ORDER — HYDROXYZINE HYDROCHLORIDE 25 MG/1
25 TABLET, FILM COATED ORAL 3 TIMES DAILY PRN
Qty: 90 TABLET | Refills: 0 | Status: SHIPPED | OUTPATIENT
Start: 2020-06-08 | End: 2020-08-26 | Stop reason: SDUPTHER

## 2020-06-08 RX ORDER — VENLAFAXINE HYDROCHLORIDE 75 MG/1
75 CAPSULE, EXTENDED RELEASE ORAL DAILY
Qty: 90 CAPSULE | Refills: 0 | Status: SHIPPED | OUTPATIENT
Start: 2020-06-08 | End: 2020-08-26 | Stop reason: SDUPTHER

## 2020-06-08 ASSESSMENT — ENCOUNTER SYMPTOMS: SHORTNESS OF BREATH: 0

## 2020-06-08 NOTE — PROGRESS NOTES
2020    TELEHEALTH EVALUATION -- Audio/Visual (During NFLKN-53 public health emergency)    HPI:    Haile Ellington (:  1965) has requested an audio/video evaluation for the following concern(s):    OCD  Chronic. Not well controlled since the pandemic. He has tolerated the increased dose of effexor ok. He is having side effects, but his OCD symptoms are much more pronounced. He is washing his hands, groceries, worried about the mail, etc. He is living with his mother, and this adds to his concerns. He has lost weight during this time - secondary to stress. Review of Systems   Respiratory: Negative for shortness of breath. Cardiovascular: Negative for chest pain. Prior to Visit Medications    Medication Sig Taking? Authorizing Provider   venlafaxine (EFFEXOR XR) 75 MG extended release capsule Take 1 capsule by mouth daily Yes Oda Nyhan, MD   pravastatin (PRAVACHOL) 40 MG tablet Take 1 tablet by mouth daily Yes Oda Nyhan, MD   hydrOXYzine (ATARAX) 25 MG tablet Take 1 tablet by mouth 3 times daily as needed for Anxiety Yes Oda Nyhan, MD   omeprazole (PRILOSEC) 20 MG delayed release capsule Take 1 capsule by mouth every morning (before breakfast)  Oda Nyhan, MD   zoster recombinant adjuvanted vaccine [facility]) 50 MCG/0.5ML SUSR injection Inject 0.5 mLs into the muscle See Admin Instructions 1 dose now and repeat in 2-6 months  Oda Nyhan, MD   loratadine (CLARITIN) 10 MG tablet Take 1 tablet by mouth daily  Oda Nyhan, MD   ketoconazole (NIZORAL) 2 % cream Apply topically daily.  To toes and heels  Oda Nyhan, MD       Social History     Tobacco Use    Smoking status: Never Smoker    Smokeless tobacco: Never Used   Substance Use Topics    Alcohol use: No    Drug use: No        Past Medical History:   Diagnosis Date    GERD (gastroesophageal reflux disease)     Hyperlipidemia     OCD (obsessive compulsive disorder)     Sleep apnea    , No

## 2020-08-26 ENCOUNTER — VIRTUAL VISIT (OUTPATIENT)
Dept: FAMILY MEDICINE CLINIC | Age: 55
End: 2020-08-26
Payer: OTHER GOVERNMENT

## 2020-08-26 PROCEDURE — 99214 OFFICE O/P EST MOD 30 MIN: CPT | Performed by: FAMILY MEDICINE

## 2020-08-26 RX ORDER — PRAVASTATIN SODIUM 40 MG
40 TABLET ORAL DAILY
Qty: 90 TABLET | Refills: 1 | Status: SHIPPED | OUTPATIENT
Start: 2020-08-26

## 2020-08-26 RX ORDER — VENLAFAXINE HYDROCHLORIDE 75 MG/1
75 CAPSULE, EXTENDED RELEASE ORAL DAILY
Qty: 90 CAPSULE | Refills: 1 | Status: SHIPPED | OUTPATIENT
Start: 2020-08-26

## 2020-08-26 RX ORDER — HYDROXYZINE HYDROCHLORIDE 25 MG/1
25 TABLET, FILM COATED ORAL 3 TIMES DAILY PRN
Qty: 180 TABLET | Refills: 2 | Status: SHIPPED | OUTPATIENT
Start: 2020-08-26

## 2020-08-26 ASSESSMENT — ENCOUNTER SYMPTOMS: SHORTNESS OF BREATH: 0

## 2020-08-26 NOTE — PROGRESS NOTES
2020    TELEHEALTH EVALUATION -- Audio/Visual (During ZXUFK-56 public health emergency)    HPI:    Alex Junior (:  1965) has requested an audio/video evaluation for the following concern(s):    LIPIDS:  No new myalgias or GI upset on pravastatin (Pravachol). Medication compliance:  compliant most of the time  Diet compliance:  compliant most of the time  Current exercise: walks 1 time(s) per day  Barriers: none      Lab Results   Component Value Date    ALT 13 2020    AST 13 2020     Lab Results   Component Value Date    CHOL 163 2020    TRIG 67 2020    HDL 48 2020    LDLCALC 86 2019        OCD  Increase in Effexor - headaches, so switched to bedtime. That helped HA, but sleep has suffered. OCD symptoms have persisted to be elevated. Hydroxyzine has not helped much when taken as prn. COVID and taking care of his mom has caused his stress/OCD to skyrocket. GERD  Chronic. Takes omeprazole daily and it works well. No side effects. Review of Systems   Respiratory: Negative for shortness of breath. Cardiovascular: Negative for chest pain. Prior to Visit Medications    Medication Sig Taking?  Authorizing Provider   pravastatin (PRAVACHOL) 40 MG tablet Take 1 tablet by mouth daily Yes Frank Allan MD   hydrOXYzine (ATARAX) 25 MG tablet Take 1 tablet by mouth 3 times daily as needed for Anxiety Yes Frank Allan MD   venlafaxine (EFFEXOR XR) 75 MG extended release capsule Take 1 capsule by mouth daily Yes Frank Allan MD   omeprazole (PRILOSEC) 20 MG delayed release capsule Take 1 capsule by mouth every morning (before breakfast) Yes Frank Allan MD   loratadine (CLARITIN) 10 MG tablet Take 1 tablet by mouth daily Yes Frank Allan MD   zoster recombinant adjuvanted vaccine [facility]) 50 MCG/0.5ML SUSR injection Inject 0.5 mLs into the muscle See Admin Instructions 1 dose now and repeat in 2-6 months  Frank Allan MD advised to contact this office for worsening conditions or problems, and seek emergency medical treatment and/or call 911 if deemed necessary. Patient identification was verified at the start of the visit: Yes    Total time spent on this encounter: Not billed by time    Services were provided through a video synchronous discussion virtually to substitute for in-person clinic visit. Patient and provider were located at their individual homes. --Nohemy Rivera MD on 8/26/2020 at 9:59 AM    An electronic signature was used to authenticate this note.

## 2021-11-01 NOTE — TELEPHONE ENCOUNTER
Noted and agree pt needs to ID provider in his area taht takes his insurance. Imiquimod Counseling:  I discussed with the patient the risks of imiquimod including but not limited to erythema, scaling, itching, weeping, crusting, and pain.  Patient understands that the inflammatory response to imiquimod is variable from person to person and was educated regarded proper titration schedule.  If flu-like symptoms develop, patient knows to discontinue the medication and contact us.